# Patient Record
Sex: MALE | Race: BLACK OR AFRICAN AMERICAN | Employment: PART TIME | URBAN - METROPOLITAN AREA
[De-identification: names, ages, dates, MRNs, and addresses within clinical notes are randomized per-mention and may not be internally consistent; named-entity substitution may affect disease eponyms.]

---

## 2020-05-27 ENCOUNTER — APPOINTMENT (OUTPATIENT)
Dept: GENERAL RADIOLOGY | Age: 25
End: 2020-05-27
Payer: COMMERCIAL

## 2020-05-27 ENCOUNTER — HOSPITAL ENCOUNTER (EMERGENCY)
Age: 25
Discharge: HOME | End: 2020-05-27
Attending: EMERGENCY MEDICINE
Payer: COMMERCIAL

## 2020-05-27 VITALS
RESPIRATION RATE: 18 BRPM | HEIGHT: 66 IN | DIASTOLIC BLOOD PRESSURE: 82 MMHG | OXYGEN SATURATION: 99 % | BODY MASS INDEX: 28.93 KG/M2 | TEMPERATURE: 98.2 F | WEIGHT: 180 LBS | SYSTOLIC BLOOD PRESSURE: 126 MMHG | HEART RATE: 82 BPM

## 2020-05-27 PROCEDURE — 99283 EMERGENCY DEPT VISIT LOW MDM: CPT

## 2020-05-27 PROCEDURE — 73130 X-RAY EXAM OF HAND: CPT

## 2020-05-27 ASSESSMENT — PAIN SCALES - GENERAL: PAINLEVEL_OUTOF10: 10

## 2020-05-27 ASSESSMENT — PAIN DESCRIPTION - LOCATION: LOCATION: FINGER (COMMENT WHICH ONE)

## 2020-05-27 ASSESSMENT — PAIN DESCRIPTION - PAIN TYPE: TYPE: ACUTE PAIN

## 2020-05-27 NOTE — ED PROVIDER NOTES
pressure. It is also likely to cause distress. Given there is an underlying fracture a also believe that this would potentially increase the risk of infection. We will defer trephination at this time. Patient given follow-up with orthopedics. Pt given strict return precautions and follow-up instructions. Pt given the opportunity to ask questions and is in agreement with the plan. Pt is stable for discharge. Clinical Impression     1. Closed fracture of tuft of distal phalanx of finger        Disposition     PATIENT REFERRED TO:  Nicole Sullivan MD  [address] [place] [address]  [phone]    Schedule an appointment as soon as possible for a visit in 1 day  Follow up on ED visit      DISCHARGE MEDICATIONS:  New Prescriptions    No medications on file       DISPOSITION Decision To Discharge 05/27/2020 04:41:44 PM    Discharge    Lamine Gonzalez MD   [facility]    This chart was generated in part by using Dragon Dictation system and may contain errors related to that system including errors in grammar, punctuation, and spelling, as well as words and phrases that may be inappropriate. When dictating, effort is made to correct spelling/grammar errors. If there are any questions or concerns please feel free to contact the dictating provider for clarification.            Rita Fung MD  05/27/20 [id number]

## 2020-05-27 NOTE — ED NOTES
Patient prepared for and ready to be discharged. Patient discharged at this time in no acute distress after verbalizing understanding of discharge instructions. Patient left after receiving After Visit Summary instructions.       Vincent Navarro RN  05/27/20 [id number]

## 2020-06-04 ENCOUNTER — OFFICE VISIT (OUTPATIENT)
Dept: ORTHOPEDIC SURGERY | Age: 25
End: 2020-06-04
Payer: COMMERCIAL

## 2020-06-04 VITALS — WEIGHT: 179.9 LBS | RESPIRATION RATE: 16 BRPM | HEIGHT: 66 IN | BODY MASS INDEX: 28.91 KG/M2

## 2020-06-04 PROCEDURE — G8419 CALC BMI OUT NRM PARAM NOF/U: HCPCS | Performed by: ORTHOPAEDIC SURGERY

## 2020-06-04 PROCEDURE — G8427 DOCREV CUR MEDS BY ELIG CLIN: HCPCS | Performed by: ORTHOPAEDIC SURGERY

## 2020-06-04 PROCEDURE — 1036F TOBACCO NON-USER: CPT | Performed by: ORTHOPAEDIC SURGERY

## 2020-06-04 PROCEDURE — 99203 OFFICE O/P NEW LOW 30 MIN: CPT | Performed by: ORTHOPAEDIC SURGERY

## 2020-06-05 NOTE — PROGRESS NOTES
Chief Complaint  Finger Injury (2nd and 4th fingers smashed in a door on 5/25/2020)      History of Present Illness:  Anne Lozano is a 21 y.o. male, right-hand-dominant, works at a cleaning job, presenting with history of right hand and specifically index and ring finger crush injury  Date of injury: 5/25/2020  Mechanism of injury: The patient sustained a crush injury to his index and ring fingers when his fingers were caught between a door. He subsequently presented to [facility] on 5/27/2020 where x-rays were obtained of his hand demonstrating index finger tuft fracture  The patient has been keeping his fingers protected as best as possible and was provided with splints. He is noted to have some bruising of his nailbeds. He does have a history of autism and is here today with his mother. He states that he does have pain and sensitivity in these fingers but states that it has been tolerable overall    Medical History  Patient's medications, allergies, past medical, surgical, social and family histories were reviewed and updated as appropriate. Review of Systems  Pertinent items are noted in HPI  Review of systems reviewed from Patient History Form dated on 6/4/20 and available in the patient's chart under the Media tab. Vital Signs  Vitals:    06/04/20 1517   Resp: 16       General Exam:   Constitutional: Patient is adequately groomed with no evidence of malnutrition  Mental Status: The patient is oriented to time, place and person. The patient's mood and affect are appropriate. Lymphatic: The lymphatic examination bilaterally reveals all areas to be without enlargement or induration. Neurological: The patient has good coordination. There is no weakness or sensory deficit.     Right ring and index finger/hand examination  Inspection: Mild swelling of the fingertips of the index and ring fingers  No evidence of open wounds or additional skin changes  There is some subungual hematoma

## 2020-06-25 ENCOUNTER — OFFICE VISIT (OUTPATIENT)
Dept: ORTHOPEDIC SURGERY | Age: 25
End: 2020-06-25
Payer: COMMERCIAL

## 2020-06-25 PROCEDURE — 99213 OFFICE O/P EST LOW 20 MIN: CPT | Performed by: ORTHOPAEDIC SURGERY

## 2020-06-25 PROCEDURE — G8428 CUR MEDS NOT DOCUMENT: HCPCS | Performed by: ORTHOPAEDIC SURGERY

## 2020-06-25 PROCEDURE — G8419 CALC BMI OUT NRM PARAM NOF/U: HCPCS | Performed by: ORTHOPAEDIC SURGERY

## 2020-06-25 PROCEDURE — 1036F TOBACCO NON-USER: CPT | Performed by: ORTHOPAEDIC SURGERY

## 2020-06-25 NOTE — PROGRESS NOTES
Assessment: 59-year-old male presenting with history of crush injury injuring right index and ring fingers  1. Right ring and index finger crush injury with associated tuft fracture index finger and nailbed subungual hematomas    Treatment Plan: I spoke with the patient and his mother today regarding his injuries and recovery. Clinically he appears to be doing very well. He has comfortable full range of motion seems to be tolerating palpation and with very little sensitivity at his fingertips. I think that his nail will continue to heal and grow. In addition we discussed the x-rays today which do demonstrate continued tuft fracture but I have a high suspicion that healing will occur either by bony union or fibrous healing. At this point I think that is appropriate for him to follow-up on an as-needed basis and call with any questions or concerns or changes and they are in agreement with that plan today    No follow-ups on file. History of Present Illness  Francisca Castillo is a 21 y.o. male, right-hand-dominant, works at a cleaning job, presenting with history of right hand and specifically index and ring finger crush injury  Date of injury: 5/25/2020  Mechanism of injury: The patient sustained a crush injury to his index and ring fingers when his fingers were caught between a door. He subsequently presented to [facility]. on 5/27/2020 where x-rays were obtained of his hand demonstrating index finger tuft fracture    Interval history:  The patient is doing well today. He is now about 1 month since his injury. He does not have any pain or any other issues with regards to his fingers. He has been using his fingers normally and has not noticed any abnormal nail growth.   He is here today with his mother and she has not noticed any new changes but says that he does not communicate very often with her regarding his right hand      Review of Systems  Pertinent items are noted in HPI  Review of